# Patient Record
Sex: MALE | Race: OTHER | HISPANIC OR LATINO | URBAN - METROPOLITAN AREA
[De-identification: names, ages, dates, MRNs, and addresses within clinical notes are randomized per-mention and may not be internally consistent; named-entity substitution may affect disease eponyms.]

---

## 2020-07-06 ENCOUNTER — INPATIENT (INPATIENT)
Facility: HOSPITAL | Age: 25
LOS: 9 days | Discharge: HOME | End: 2020-07-16
Attending: PSYCHIATRY & NEUROLOGY | Admitting: PSYCHIATRY & NEUROLOGY
Payer: OTHER MISCELLANEOUS

## 2020-07-06 VITALS
TEMPERATURE: 98 F | DIASTOLIC BLOOD PRESSURE: 75 MMHG | SYSTOLIC BLOOD PRESSURE: 134 MMHG | HEART RATE: 77 BPM | RESPIRATION RATE: 18 BRPM | OXYGEN SATURATION: 99 %

## 2020-07-06 DIAGNOSIS — Z90.5 ACQUIRED ABSENCE OF KIDNEY: Chronic | ICD-10-CM

## 2020-07-06 DIAGNOSIS — F12.20 CANNABIS DEPENDENCE, UNCOMPLICATED: ICD-10-CM

## 2020-07-06 DIAGNOSIS — F19.94 OTHER PSYCHOACTIVE SUBSTANCE USE, UNSPECIFIED WITH PSYCHOACTIVE SUBSTANCE-INDUCED MOOD DISORDER: ICD-10-CM

## 2020-07-06 LAB
AMPHET UR-MCNC: NEGATIVE — SIGNIFICANT CHANGE UP
APPEARANCE UR: CLEAR — SIGNIFICANT CHANGE UP
BACTERIA # UR AUTO: NEGATIVE — SIGNIFICANT CHANGE UP
BARBITURATES UR SCN-MCNC: NEGATIVE — SIGNIFICANT CHANGE UP
BENZODIAZ UR-MCNC: NEGATIVE — SIGNIFICANT CHANGE UP
BILIRUB UR-MCNC: NEGATIVE — SIGNIFICANT CHANGE UP
BLOOD UR QL VISUAL: NEGATIVE — SIGNIFICANT CHANGE UP
CANNABINOIDS UR-MCNC: POSITIVE — SIGNIFICANT CHANGE UP
COCAINE METAB.OTHER UR-MCNC: NEGATIVE — SIGNIFICANT CHANGE UP
COLOR SPEC: YELLOW — SIGNIFICANT CHANGE UP
GLUCOSE UR-MCNC: NEGATIVE — SIGNIFICANT CHANGE UP
HYALINE CASTS # UR AUTO: NEGATIVE — SIGNIFICANT CHANGE UP
KETONES UR-MCNC: SIGNIFICANT CHANGE UP
LEUKOCYTE ESTERASE UR-ACNC: SIGNIFICANT CHANGE UP
METHADONE UR-MCNC: NEGATIVE — SIGNIFICANT CHANGE UP
NITRITE UR-MCNC: NEGATIVE — SIGNIFICANT CHANGE UP
OPIATES UR-MCNC: NEGATIVE — SIGNIFICANT CHANGE UP
OXYCODONE UR-MCNC: POSITIVE — HIGH
PCP UR-MCNC: NEGATIVE — SIGNIFICANT CHANGE UP
PH UR: 6 — SIGNIFICANT CHANGE UP (ref 5–8)
PROT UR-MCNC: 50 — SIGNIFICANT CHANGE UP
RBC CASTS # UR COMP ASSIST: SIGNIFICANT CHANGE UP (ref 0–?)
SP GR SPEC: 1.04 — SIGNIFICANT CHANGE UP (ref 1–1.04)
SQUAMOUS # UR AUTO: SIGNIFICANT CHANGE UP
UROBILINOGEN FLD QL: NORMAL — SIGNIFICANT CHANGE UP
WBC UR QL: HIGH (ref 0–?)

## 2020-07-06 RX ORDER — DIPHENHYDRAMINE HCL 50 MG
50 CAPSULE ORAL EVERY 6 HOURS
Refills: 0 | Status: DISCONTINUED | OUTPATIENT
Start: 2020-07-07 | End: 2020-07-16

## 2020-07-06 RX ORDER — HALOPERIDOL DECANOATE 100 MG/ML
5 INJECTION INTRAMUSCULAR EVERY 6 HOURS
Refills: 0 | Status: DISCONTINUED | OUTPATIENT
Start: 2020-07-07 | End: 2020-07-16

## 2020-07-06 NOTE — ED BEHAVIORAL HEALTH ASSESSMENT NOTE - NS ED [facility] MED ROS CONSTITUTIONAL SYMPTOMS
Virtual Regular Visit    Reason for visit is      Encounter provider Ronnie Welsh PA-C    Provider located at [address] [facility] [address] [facility]  [address] [facility] [address] [place] [address] [phone]      [facility] [address] )     [address] [place] [address] face-to-face evaluation by the physician  Eusebia Cruz understands and accepts these terms  No complaints

## 2020-07-06 NOTE — ED PROVIDER NOTE - CARE PLAN
Principal Discharge DX:	Cannabis use disorder, severe, dependence Principal Discharge DX:	Substance induced mood disorder

## 2020-07-06 NOTE — ED BEHAVIORAL HEALTH ASSESSMENT NOTE - OTHER PAST PSYCHIATRIC HISTORY (INCLUDE DETAILS REGARDING ONSET, COURSE OF ILLNESS, INPATIENT/OUTPATIENT TREATMENT)
Patient has 2 past psych admissions, once in 2015 s/p taking 12 tabs acid and becoming suicidal and jumping out 2nd floor window, once in March 2019 2/2 aggressive behavior. Prior dx include cannabis use d/o, unspecified psychosis, conduct disorder, opiod related disorder, unspecified anxiety disorder, unspecified depressive disorder. Pt also did outpt rehab x1 week in 2017. Last outpt psych care in 2015. Patient has 2 past psych admissions, once in 2015 s/p taking 12 tabs acid and becoming suicidal and jumping out 2nd floor window, once in March 2019 2/2 aggressive behavior. Prior dx include cannabis use d/o, unspecified psychosis, conduct disorder, opoid related disorder, unspecified anxiety disorder, unspecified depressive disorder. Pt also did outpt rehab x1 week in 2017. Last outpt psych care in 2015.

## 2020-07-06 NOTE — ED BEHAVIORAL HEALTH ASSESSMENT NOTE - SUICIDE RISK FACTORS
Alcohol/Substance abuse disorders/Conduct problems current/past/Cluster B Personality disorders or traits current/past/Impulsivity

## 2020-07-06 NOTE — ED ADULT TRIAGE NOTE - CHIEF COMPLAINT QUOTE
Patient brought to ER by EMS for psych evaluation from home. Pt has been noncompliant with his medications and is willing to speak with MD concerning his aggressiveness and the fact that he needs help. Pt is accompanied by his brother Shine: [phone].

## 2020-07-06 NOTE — ED PROVIDER NOTE - CLINICAL SUMMARY MEDICAL DECISION MAKING FREE TEXT BOX
This is  a24 yr M, [facility] psychosis with c/o acting out behaviour, agitation, delusional. Pt states he is here because he needs help. He states he can not handle his anger all the time, and he is ready for to get better. He endorses previous psychiatric hospitalization, but no follow ups. Labs, psych consult. This is  a24 yr M, pmh psychosis with c/o acting out behaviour, agitation, delusional. Pt states he is here because he needs help. He states he can not handle his anger all the time, and he is ready for to get better. He endorses previous psychiatric hospitalization, but no follow ups. Labs- results WNL , psych consult- out patient follow up. This is  a24 yr M, pmh psychosis with c/o acting out behaviour, agitation, delusional. Pt states he is here because he needs help. He states he can not handle his anger all the time, and he is ready for to get better. He endorses previous psychiatric hospitalization, but no follow ups. Labs- results WNL , psych consult- recommendation in patient follow up.

## 2020-07-06 NOTE — ED BEHAVIORAL HEALTH ASSESSMENT NOTE - SUMMARY
Patient is a 25 y/o male, domiciled w/ mother, employed in [employer], recently accepted to [place], w/ PMHx single kidney 2/2 resection s/p infection as an infant, w/ PPHx cannabis use d/o vs unspecified psychosis, w/ hx 2 past inpt psych admissions (most recently March 2019), w/ hx one SA via jumping out 2nd floor window in the context of LSD intoxication, w/ legal hx and hx violence towards , w/ hx of extensive cannabis use, remote hx of LSD use, BIBEMS after argument w/ mother. Per collateral, pt appears to have hx of behavioral problems and possible psychotic sx exclusively in the context of extensive cannabis use. Family reports pt has not endorsed SI/HI to them or been violent in the home. On evaluation, pt denies SI/HI; no psychotic sx elicited. Overall pt likely w/ strange behavior and possible psychotic sx in the context of heavy cannabis abuse and possible underlying cluster B personality disorder. There is high suspicion that sx are substance induced. At this time, pt is not an acute risk to himself or others and he cannot be involuntarily admitted to a psychiatric hospital. Plan to discharge w/ substance clinic info f/u. Patient is a 23 y/o male, domiciled w/ mother, employed in [employer], recently accepted to [facility], w/ PMHx single kidney 2/2 resection s/p infection as an infant, w/ PPHx cannabis use d/o vs unspecified psychosis, w/ hx 2 past inpt psych admissions (most recently March 2019), w/ hx one SA via jumping out 2nd floor window in the context of LSD intoxication, w/ legal hx and hx violence towards , w/ hx of extensive cannabis use, remote hx of LSD use, BIBEMS after argument w/ mother. Per collateral, pt appears to have hx of behavioral problems, irritability, and possible psychotic sx in the context of extensive cannabis use. Family reports pt has not endorsed SI/HI to them or been violent in the home. On evaluation, pt denies SI/HI; no psychotic sx elicited, though w/ some manic sx including racing thoughts and grandiosity. Overall pt likely w/ strange behavior in the context of heavy cannabis abuse and possible hypomanic episode. There is high suspicion that sx are substance induced. At this time, pt is not an acute risk to himself or others. He is refusing admission, and he cannot be involuntarily admitted to a psychiatric hospital. Plan to discharge w/ substance clinic info and Crisis Clinic f/u. Patient is a 25 y/o male, domiciled w/ mother, employed in [employer], recently accepted to [place], w/ PMHx single kidney 2/2 resection s/p infection as an infant, w/ PPHx cannabis use d/o vs unspecified psychosis, w/ hx 2 past inpt psych admissions (most recently March 2019), w/ hx one SA via jumping out 2nd floor window in the context of LSD intoxication, w/ legal hx and hx violence towards , w/ hx of extensive cannabis use, remote hx of LSD use, BIBEMS after argument w/ mother. Per collateral, pt appears to have hx of behavioral problems, irritability, and possible psychotic sx in the context of extensive cannabis use. Family reports pt has not endorsed SI/HI to them or been violent in the home.   On evaluation, pt denies SI/HI; no psychotic sx elicited, though w/ some manic sx including racing thoughts and grandiosity, insomnia , increase in spending money . Patient initially declines psychiatric admission , however pt's mother refused to take pt home and pt spoke to the mother who asked pt to get help. Pt si agreeing to voluntary admission for stabilization and to start treatment.

## 2020-07-06 NOTE — ED BEHAVIORAL HEALTH NOTE - BEHAVIORAL HEALTH NOTE
Writer contacted pt’s brother, Shine, at [phone] to continue conversation previously had with MD. Writer received VM with mailbox full.

## 2020-07-06 NOTE — ED BEHAVIORAL HEALTH ASSESSMENT NOTE - REFERRAL / APPOINTMENT DETAILS
Referred to substance clinic. Referred to substance clinic and given info for Behavioral Health Crisis Clinic.

## 2020-07-06 NOTE — ED PROVIDER NOTE - PATIENT PORTAL LINK FT
You can access the FollowMyHealth Patient Portal offered by [facility] by registering at the following website: [email or web address]. By joining [facility]’s FollowMyHealth portal, you will also be able to view your health information using other applications (apps) compatible with our system.

## 2020-07-06 NOTE — ED BEHAVIORAL HEALTH ASSESSMENT NOTE - DESCRIPTION
Pt calm and cooperative in ED.    Vital Signs Last 24 Hrs  T(C): 36.9 (06 Jul 2020 19:32), Max: 36.9 (06 Jul 2020 19:32)  T(F): 98.4 (06 Jul 2020 19:32), Max: 98.4 (06 Jul 2020 19:32)  HR: 77 (06 Jul 2020 19:32) (77 - 77)  BP: 134/75 (06 Jul 2020 19:32) (134/75 - 134/75)  BP(mean): --  RR: 18 (06 Jul 2020 19:32) (18 - 18)  SpO2: 99% (06 Jul 2020 19:32) (99% - 99%) renal agenesis s/p resection see hpi

## 2020-07-06 NOTE — ED BEHAVIORAL HEALTH ASSESSMENT NOTE - HPI (INCLUDE ILLNESS QUALITY, SEVERITY, DURATION, TIMING, CONTEXT, MODIFYING FACTORS, ASSOCIATED SIGNS AND SYMPTOMS)
Patient is a 23 y/o male, domiciled w/ mother, employed in [employer], recently accepted to [employer], w/ PMHx single kidney 2/2 resection s/p infection as an infant, w/ PPHx cannabis use d/o vs unspecified psychosis, w/ hx 2 past inpt psych admissions (most recently March 2019), w/ hx one SA via jumping out 2nd floor window in the context of LSD intoxication, w/ legal hx and hx violence towards , w/ hx of extensive cannabis use, remote hx of LSD use, BIBEMS after argument w/ mother.    Patient reports that he "snapped" and got in an argument w/ his mother today when they were arguing about money. He says they were yelling, but there were no physical blows. He says he took a knife and put it on his dresser to let his mother know not to call the , but he says he never intended to hurt her with it and let her take it away without a fight. Denies access to guns. He says he has been thinking a lot about his life and his success and is frustrated with how his mother treats him. Pt endorses extensive hx of substance use. Currently reports smoking cannabis 5-10x daily, most recently 2 days ago. Denies K2/spice use. Also drinks alcohol regularly, increasing amount of intake, drank 1 bottle of wine last night. Reports taking 1 Percocet today for back pain. Reports "smoking cocaine" in 2017. Reports taking 12 tabs of LSD in 2015 to celebrate a rugby team win, after which he became suicidal and jumped out a 2nd floor window, resulting in a psych admission. Denies additional SI ever. He says he is looking forward to starting school at [employer] to study computer engineering. Denies HI. Denies AVH, paranoia. He says his family says he sometimes "speaks in riddles," but he says he just does this because he is lying. Reports limited sleep (~4 hours a night), but says this is his baseline. Reports "calm" mood, denies depressive sx.     Collateral was obtained from pt's brother and mother (see contact info above). Brother reports that the pt has a hx of "episodes" where he is "not himself," sometimes talking to the wall. He says today he took his mother's debit card and was packing stuff to leave and when his mother asked what he was doing, he started yelling, saying he's in charge of the house. Mother denies any threats to physically harm her and denies any hx of violence in the home. Both mother and brother deny any recent suicidal or homicidal/violent ideations from the pt. Brother reports the pt sometimes speaks "in riddles" and is long winded. He says he mentioned possibly using K2 today and uses a lot of marijuana. They say he is "fixated on money," sometimes talking to himself for hours at night, sometimes accusing the mother of coming into his room when she hasn't. She says he changes his clothes a lot. Patient is a 25 y/o male, domiciled w/ mother, employed in [employer], recently accepted to [employer], w/ PMHx single kidney 2/2 resection s/p infection as an infant, w/ PPHx cannabis use d/o vs unspecified psychosis, w/ hx 2 past inpt psych admissions (most recently March 2019), w/ hx one SA via jumping out 2nd floor window in the context of LSD intoxication, w/ legal hx and hx violence towards , w/ hx of extensive cannabis use, remote hx of LSD use, BIBEMS after argument w/ mother.    Patient reports that he "snapped" and got in an argument w/ his mother today when they were arguing about money. He says they were yelling, but there were no physical blows. He says he took a knife and put it on his dresser to let his mother know not to call the , but he says he never intended to hurt her with it and let her take it away without a fight. Denies access to guns. He says he has been thinking a lot about his life and his success and is frustrated with how his mother treats him. Pt endorses extensive hx of substance use. Currently reports smoking cannabis 5-10x daily, most recently 2 days ago. Denies K2/spice use. Also drinks alcohol regularly, increasing amount of intake, drank 1 bottle of wine last night. Reports taking 1 Percocet today for back pain. Reports "smoking cocaine" in 2017. Reports taking 12 tabs of LSD in 2015 to celebrate a rugby team win, after which he became suicidal and jumped out a 2nd floor window, resulting in a psych admission. Denies additional SI ever. He says he is looking forward to starting school at [employer] to study computer engineering. Denies HI. Denies AVH, paranoia. He says his family says he sometimes "speaks in riddles," but he says he just does this because he is lying. Reports limited sleep (~4 hours a night), but says this is his baseline. Reports "calm" mood, denies depressive sx.     Collateral was obtained from pt's brother and mother (see contact info above). Brother reports that the pt has a hx of "episodes" where he is "not himself," sometimes talking to the wall. He says today he took his mother's debit card and was packing stuff to leave and when his mother asked what he was doing, he started yelling, saying he's in charge of the house. Mother denies any threats to physically harm her and denies any hx of violence in the home. Both mother and brother deny any recent suicidal or homicidal/violent ideations from the pt. Brother reports the pt sometimes speaks "in riddles" and is long winded. He says he mentioned possibly using K2 today and uses a lot of marijuana. They say he is "fixated on money," sometimes talking to himself for hours at night, sometimes accusing the mother of coming into his room when she hasn't. She says he changes his clothes a lot. They report he eats, sleeps, and takes care of himself well otherwise.

## 2020-07-06 NOTE — ED BEHAVIORAL HEALTH ASSESSMENT NOTE - RISK ASSESSMENT
Acute- substance abuse, mood lability, irritability, impulsivity  chronic- hx SA, hx substance abuse, hx hospitalizations, legal hx  At this time, pt is not an acutely elevated risk for suicide though he has chronic risk factors that cannot be mitigated by a psychiatric admission. Low Acute Suicide Risk

## 2020-07-06 NOTE — ED BEHAVIORAL HEALTH ASSESSMENT NOTE - DIFFERENTIAL
cluster B personality d/o vs substance-induced mood/psychotic d/o vs bipolar d/o vs unspecified psychosis vs prodromal psychosis substance-induced mood/psychotic d/o vs bipolar d/o vs unspecified psychosis vs prodromal psychosis vs cluster b personality d/o substance-induced mood/psychotic d/o vs bipolar d/o, current episode manic vs unspecified psychosis vs prodromal psychosis vs cluster b personality d/o

## 2020-07-06 NOTE — ED PROVIDER NOTE - PROGRESS NOTE DETAILS
Pt signed out pending results of labs and COVID all of which were unremarkable.  Cleared for admission to [facility]
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
## 2020-07-06 NOTE — ED BEHAVIORAL HEALTH ASSESSMENT NOTE - DESCRIPTION (FIRST USE, LAST USE, QUANTITY, FREQUENCY, DURATION)
drank 1 bottle of wine last night, 2 bottles over past week, drinking more recently daily use, 5-10x daily smoked cocaine in 2017? LSD, pt took 12 tabs at once in 2015 while celebrating a sports win, then became suicidal and jumped off 2nd floor balcony
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
## 2020-07-07 DIAGNOSIS — F19.94 OTHER PSYCHOACTIVE SUBSTANCE USE, UNSPECIFIED WITH PSYCHOACTIVE SUBSTANCE-INDUCED MOOD DISORDER: ICD-10-CM

## 2020-07-07 LAB
ALBUMIN SERPL ELPH-MCNC: 4.9 G/DL — SIGNIFICANT CHANGE UP (ref 3.3–5)
ALP SERPL-CCNC: 52 U/L — SIGNIFICANT CHANGE UP (ref 40–120)
ALT FLD-CCNC: 23 U/L — SIGNIFICANT CHANGE UP (ref 4–41)
ANION GAP SERPL CALC-SCNC: 20 MMO/L — HIGH (ref 7–14)
APAP SERPL-MCNC: < 15 UG/ML — LOW (ref 15–25)
AST SERPL-CCNC: 22 U/L — SIGNIFICANT CHANGE UP (ref 4–40)
BASOPHILS # BLD AUTO: 0.03 K/UL — SIGNIFICANT CHANGE UP (ref 0–0.2)
BASOPHILS NFR BLD AUTO: 0.3 % — SIGNIFICANT CHANGE UP (ref 0–2)
BILIRUB SERPL-MCNC: 0.9 MG/DL — SIGNIFICANT CHANGE UP (ref 0.2–1.2)
BUN SERPL-MCNC: 19 MG/DL — SIGNIFICANT CHANGE UP (ref 7–23)
CALCIUM SERPL-MCNC: 10.4 MG/DL — SIGNIFICANT CHANGE UP (ref 8.4–10.5)
CHLORIDE SERPL-SCNC: 103 MMOL/L — SIGNIFICANT CHANGE UP (ref 98–107)
CO2 SERPL-SCNC: 17 MMOL/L — LOW (ref 22–31)
CREAT SERPL-MCNC: 1.26 MG/DL — SIGNIFICANT CHANGE UP (ref 0.5–1.3)
EOSINOPHIL # BLD AUTO: 0.04 K/UL — SIGNIFICANT CHANGE UP (ref 0–0.5)
EOSINOPHIL NFR BLD AUTO: 0.3 % — SIGNIFICANT CHANGE UP (ref 0–6)
ETHANOL BLD-MCNC: < 10 MG/DL — SIGNIFICANT CHANGE UP
GLUCOSE SERPL-MCNC: 85 MG/DL — SIGNIFICANT CHANGE UP (ref 70–99)
HCT VFR BLD CALC: 42.6 % — SIGNIFICANT CHANGE UP (ref 39–50)
HGB BLD-MCNC: 14.7 G/DL — SIGNIFICANT CHANGE UP (ref 13–17)
IMM GRANULOCYTES NFR BLD AUTO: 0.3 % — SIGNIFICANT CHANGE UP (ref 0–1.5)
LYMPHOCYTES # BLD AUTO: 2.66 K/UL — SIGNIFICANT CHANGE UP (ref 1–3.3)
LYMPHOCYTES # BLD AUTO: 22.9 % — SIGNIFICANT CHANGE UP (ref 13–44)
MCHC RBC-ENTMCNC: 31.2 PG — SIGNIFICANT CHANGE UP (ref 27–34)
MCHC RBC-ENTMCNC: 34.5 % — SIGNIFICANT CHANGE UP (ref 32–36)
MCV RBC AUTO: 90.4 FL — SIGNIFICANT CHANGE UP (ref 80–100)
MONOCYTES # BLD AUTO: 0.71 K/UL — SIGNIFICANT CHANGE UP (ref 0–0.9)
MONOCYTES NFR BLD AUTO: 6.1 % — SIGNIFICANT CHANGE UP (ref 2–14)
NEUTROPHILS # BLD AUTO: 8.13 K/UL — HIGH (ref 1.8–7.4)
NEUTROPHILS NFR BLD AUTO: 70.1 % — SIGNIFICANT CHANGE UP (ref 43–77)
NRBC # FLD: 0 K/UL — SIGNIFICANT CHANGE UP (ref 0–0)
PLATELET # BLD AUTO: 261 K/UL — SIGNIFICANT CHANGE UP (ref 150–400)
PMV BLD: 10.6 FL — SIGNIFICANT CHANGE UP (ref 7–13)
POTASSIUM SERPL-MCNC: 4.5 MMOL/L — SIGNIFICANT CHANGE UP (ref 3.5–5.3)
POTASSIUM SERPL-SCNC: 4.5 MMOL/L — SIGNIFICANT CHANGE UP (ref 3.5–5.3)
PROT SERPL-MCNC: 7.9 G/DL — SIGNIFICANT CHANGE UP (ref 6–8.3)
RBC # BLD: 4.71 M/UL — SIGNIFICANT CHANGE UP (ref 4.2–5.8)
RBC # FLD: 13.1 % — SIGNIFICANT CHANGE UP (ref 10.3–14.5)
SALICYLATES SERPL-MCNC: < 5 MG/DL — LOW (ref 15–30)
SARS-COV-2 IGG SERPL QL IA: NEGATIVE — SIGNIFICANT CHANGE UP
SARS-COV-2 IGM SERPL IA-ACNC: <0.1 INDEX — SIGNIFICANT CHANGE UP
SARS-COV-2 RNA SPEC QL NAA+PROBE: SIGNIFICANT CHANGE UP
SODIUM SERPL-SCNC: 140 MMOL/L — SIGNIFICANT CHANGE UP (ref 135–145)
TSH SERPL-MCNC: 3.5 UIU/ML — SIGNIFICANT CHANGE UP (ref 0.27–4.2)
WBC # BLD: 11.6 K/UL — HIGH (ref 3.8–10.5)
WBC # FLD AUTO: 11.6 K/UL — HIGH (ref 3.8–10.5)

## 2020-07-07 PROCEDURE — 99222 1ST HOSP IP/OBS MODERATE 55: CPT | Mod: GC

## 2020-07-07 RX ORDER — LIDOCAINE 4 G/100G
1 CREAM TOPICAL ONCE
Refills: 0 | Status: COMPLETED | OUTPATIENT
Start: 2020-07-07 | End: 2020-07-07

## 2020-07-07 RX ORDER — LIDOCAINE 4 G/100G
1 CREAM TOPICAL EVERY 24 HOURS
Refills: 0 | Status: DISCONTINUED | OUTPATIENT
Start: 2020-07-07 | End: 2020-07-16

## 2020-07-07 RX ORDER — ARIPIPRAZOLE 15 MG/1
5 TABLET ORAL AT BEDTIME
Refills: 0 | Status: DISCONTINUED | OUTPATIENT
Start: 2020-07-07 | End: 2020-07-07

## 2020-07-07 RX ORDER — DIPHENHYDRAMINE HCL 50 MG
50 CAPSULE ORAL EVERY 6 HOURS
Refills: 0 | Status: DISCONTINUED | OUTPATIENT
Start: 2020-07-07 | End: 2020-07-16

## 2020-07-07 RX ORDER — HALOPERIDOL DECANOATE 100 MG/ML
5 INJECTION INTRAMUSCULAR EVERY 6 HOURS
Refills: 0 | Status: DISCONTINUED | OUTPATIENT
Start: 2020-07-07 | End: 2020-07-16

## 2020-07-07 RX ADMIN — HALOPERIDOL DECANOATE 5 MILLIGRAM(S): 100 INJECTION INTRAMUSCULAR at 12:21

## 2020-07-07 RX ADMIN — Medication 2 MILLIGRAM(S): at 12:22

## 2020-07-07 RX ADMIN — LIDOCAINE 1 PATCH: 4 CREAM TOPICAL at 12:22

## 2020-07-07 NOTE — ED ADULT NURSE REASSESSMENT NOTE - NS ED NURSE REASSESS COMMENT FT1
EKG completed, covid result pending for ANNABELLE and ANDRZEJ and. Pt currently laying in bed, NAD. Will continue to monitor for safety.
Psych eval completed, tp signed voluntary admission. Pt cooperative with Covid and lab specimen collected, results pending. EKG ordered. Pending [facility] adm once medically cleared. Currently, pt remains awake, talking to self in room, pacing hw without agitation. Will continue to monitor for safety.
MC by Dillan GANT for [facility] adm. Report given to Boyd PINO. Pt transported at this time with security and PCA for voluntary adm.

## 2020-07-08 LAB
CHOLEST SERPL-MCNC: 183 MG/DL — SIGNIFICANT CHANGE UP (ref 120–199)
HBA1C BLD-MCNC: 5.1 % — SIGNIFICANT CHANGE UP (ref 4–5.6)
HDLC SERPL-MCNC: 40 MG/DL — SIGNIFICANT CHANGE UP (ref 35–55)
LIPID PNL WITH DIRECT LDL SERPL: 132 MG/DL — SIGNIFICANT CHANGE UP
TRIGL SERPL-MCNC: 82 MG/DL — SIGNIFICANT CHANGE UP (ref 10–149)

## 2020-07-08 PROCEDURE — 99232 SBSQ HOSP IP/OBS MODERATE 35: CPT | Mod: GC

## 2020-07-08 RX ORDER — OLANZAPINE 15 MG/1
5 TABLET, FILM COATED ORAL AT BEDTIME
Refills: 0 | Status: DISCONTINUED | OUTPATIENT
Start: 2020-07-08 | End: 2020-07-10

## 2020-07-08 RX ADMIN — OLANZAPINE 5 MILLIGRAM(S): 15 TABLET, FILM COATED ORAL at 19:03

## 2020-07-09 LAB
APPEARANCE UR: CLEAR — SIGNIFICANT CHANGE UP
BILIRUB UR-MCNC: NEGATIVE — SIGNIFICANT CHANGE UP
BLOOD UR QL VISUAL: SIGNIFICANT CHANGE UP
COLOR SPEC: YELLOW — SIGNIFICANT CHANGE UP
GLUCOSE UR-MCNC: NEGATIVE — SIGNIFICANT CHANGE UP
KETONES UR-MCNC: SIGNIFICANT CHANGE UP
LEUKOCYTE ESTERASE UR-ACNC: NEGATIVE — SIGNIFICANT CHANGE UP
NITRITE UR-MCNC: NEGATIVE — SIGNIFICANT CHANGE UP
PH UR: 6 — SIGNIFICANT CHANGE UP (ref 5–8)
PROT UR-MCNC: 10 — SIGNIFICANT CHANGE UP
SP GR SPEC: 1.03 — SIGNIFICANT CHANGE UP (ref 1–1.04)
UROBILINOGEN FLD QL: SIGNIFICANT CHANGE UP

## 2020-07-09 PROCEDURE — 99232 SBSQ HOSP IP/OBS MODERATE 35: CPT

## 2020-07-09 PROCEDURE — 73120 X-RAY EXAM OF HAND: CPT | Mod: 26,LT

## 2020-07-09 RX ORDER — IBUPROFEN 200 MG
400 TABLET ORAL EVERY 8 HOURS
Refills: 0 | Status: DISCONTINUED | OUTPATIENT
Start: 2020-07-09 | End: 2020-07-09

## 2020-07-09 RX ORDER — NICOTINE POLACRILEX 2 MG
2 GUM BUCCAL
Refills: 0 | Status: DISCONTINUED | OUTPATIENT
Start: 2020-07-09 | End: 2020-07-16

## 2020-07-09 RX ORDER — DIPHENHYDRAMINE HCL 50 MG
50 CAPSULE ORAL ONCE
Refills: 0 | Status: COMPLETED | OUTPATIENT
Start: 2020-07-09 | End: 2020-07-09

## 2020-07-09 RX ORDER — ACETAMINOPHEN 500 MG
325 TABLET ORAL EVERY 4 HOURS
Refills: 0 | Status: DISCONTINUED | OUTPATIENT
Start: 2020-07-09 | End: 2020-07-16

## 2020-07-09 RX ADMIN — Medication 2 MILLIGRAM(S): at 22:00

## 2020-07-09 RX ADMIN — Medication 50 MILLIGRAM(S): at 22:00

## 2020-07-09 RX ADMIN — OLANZAPINE 5 MILLIGRAM(S): 15 TABLET, FILM COATED ORAL at 21:11

## 2020-07-10 PROCEDURE — 99231 SBSQ HOSP IP/OBS SF/LOW 25: CPT | Mod: GC

## 2020-07-10 RX ORDER — LANOLIN ALCOHOL/MO/W.PET/CERES
5 CREAM (GRAM) TOPICAL AT BEDTIME
Refills: 0 | Status: DISCONTINUED | OUTPATIENT
Start: 2020-07-10 | End: 2020-07-16

## 2020-07-10 RX ORDER — OLANZAPINE 15 MG/1
7.5 TABLET, FILM COATED ORAL AT BEDTIME
Refills: 0 | Status: DISCONTINUED | OUTPATIENT
Start: 2020-07-10 | End: 2020-07-13

## 2020-07-10 RX ADMIN — Medication 2 MILLIGRAM(S): at 15:53

## 2020-07-10 RX ADMIN — Medication 2 MILLIGRAM(S): at 20:45

## 2020-07-10 RX ADMIN — Medication 5 MILLIGRAM(S): at 23:04

## 2020-07-10 RX ADMIN — Medication 325 MILLIGRAM(S): at 22:00

## 2020-07-10 RX ADMIN — OLANZAPINE 7.5 MILLIGRAM(S): 15 TABLET, FILM COATED ORAL at 20:56

## 2020-07-11 PROCEDURE — 99232 SBSQ HOSP IP/OBS MODERATE 35: CPT

## 2020-07-11 RX ADMIN — OLANZAPINE 7.5 MILLIGRAM(S): 15 TABLET, FILM COATED ORAL at 20:58

## 2020-07-11 RX ADMIN — Medication 2 MILLIGRAM(S): at 11:46

## 2020-07-11 RX ADMIN — Medication 5 MILLIGRAM(S): at 21:53

## 2020-07-11 RX ADMIN — Medication 2 MILLIGRAM(S): at 08:55

## 2020-07-11 RX ADMIN — Medication 5 MILLIGRAM(S): at 11:47

## 2020-07-13 ENCOUNTER — OUTPATIENT (OUTPATIENT)
Dept: OUTPATIENT SERVICES | Facility: HOSPITAL | Age: 25
LOS: 1 days | Discharge: HOME | End: 2020-07-13

## 2020-07-13 DIAGNOSIS — Z90.5 ACQUIRED ABSENCE OF KIDNEY: Chronic | ICD-10-CM

## 2020-07-13 PROCEDURE — 99232 SBSQ HOSP IP/OBS MODERATE 35: CPT | Mod: GC

## 2020-07-13 RX ORDER — OLANZAPINE 15 MG/1
10 TABLET, FILM COATED ORAL AT BEDTIME
Refills: 0 | Status: DISCONTINUED | OUTPATIENT
Start: 2020-07-13 | End: 2020-07-16

## 2020-07-13 RX ADMIN — OLANZAPINE 10 MILLIGRAM(S): 15 TABLET, FILM COATED ORAL at 20:46

## 2020-07-14 PROCEDURE — 99232 SBSQ HOSP IP/OBS MODERATE 35: CPT | Mod: GC

## 2020-07-14 RX ADMIN — OLANZAPINE 10 MILLIGRAM(S): 15 TABLET, FILM COATED ORAL at 21:26

## 2020-07-15 PROCEDURE — 99232 SBSQ HOSP IP/OBS MODERATE 35: CPT | Mod: GC

## 2020-07-15 RX ORDER — OLANZAPINE 15 MG/1
1 TABLET, FILM COATED ORAL
Qty: 30 | Refills: 0
Start: 2020-07-15 | End: 2020-08-13

## 2020-07-15 RX ADMIN — OLANZAPINE 10 MILLIGRAM(S): 15 TABLET, FILM COATED ORAL at 21:41

## 2020-07-16 VITALS — DIASTOLIC BLOOD PRESSURE: 66 MMHG | TEMPERATURE: 98 F | HEART RATE: 68 BPM | SYSTOLIC BLOOD PRESSURE: 128 MMHG

## 2021-03-10 NOTE — ED BEHAVIORAL HEALTH ASSESSMENT NOTE - ABORTED (SELF-INTERRUPTED) ATTEMPT:
ENDOCRINE CONSULTATION    Chief Complaint   Patient presents with   â¢ Diabetes Mellitus     New Consult- Referred by Ruy Carlson MD     HISTORY OF PRESENT ILLNESS:  Evaristo Santiago is here for initial endocrinology consultation for uncontrolled type 2 diabetes mellitus with hyperglycemia. Fransisca Hicks is accompanied by her  today. Her diabetes is complicated by metastatic breast cancer with history of stroke about years ago she uses a cane for support. She has brought her OneTouch Verio glucometer with her however her test strips bottle shows an expiration date of 2016. She is testing glucose daily and high glucose readings are noted. She is currently on glimepiride 2 mg daily before breakfast and dinner metformin extended release was reduced from 1000 mg twice a day to 500 mg twice a day. She says that she is eating at regular intervals and her appetite is good. Diabetes review type 2 diabetes uncontrolled because of chemotherapy recently. Glucose testing usually once a day brought in her glucometer although test strips appear  average glucose of 250 mg/dL noted glucose readings are above 200 all the time. REVIEW OF SYSTEMS:  Constitutional Review weight loss of 2 kg noted on chart review since her appointment in 2020 with her primary physician Dr. Yoly Mcgovern. No complains of severe fatigue. Eyes no acute vision complaints. Last Diabetic Eye Exam:  not recently. Cardiovascular/Respiratory no acute breathing problems. Urinary she has a history of urinary frequency no acute complaints. GI she is on Linzess. No acute complaints today. Skin no present complaints. Musculoskeletal uses cane for support since her stroke complaints. Neurological history of migraine headaches history of stroke with right hemiparesis. No acute complaints today. Psychological no acute complaints today. Gyn  4 para 4.   Hematology-Oncology history of breast cancer with metastases. Endocrine hypothyroidism on stable dose of levothyroxine 88 mcg daily. Outpatient Medications Marked as Taking for the 3/10/21 encounter (Office Visit) with Sri Li MD   Medication Sig Dispense Refill   â¢ Alpelisib, 300 MG Daily Dose, 2 x 150 MG Tablet Therapy Pack Take 1 tablet by mouth daily (with breakfast). BIN#: [id number] PCN#:[id number] [facility]#: [id number] #:[id number] 28 each 0   â¢ docusate sodium-sennosides (GoodSense Stimulant Laxative) 50-8.6 MG per tablet Take 2 tablets by mouth daily. â¢ glimepiride (Amaryl) 2 MG tablet Take 1 tablet by mouth 2 times daily (before meals). 180 tablet 1   â¢ busPIRone (BUSPAR) 15 MG tablet Take 1 tablet by mouth 2 times daily. 60 tablet 0   â¢ divalproex (DEPAKOTE) 500 MG delayed release EC tablet Take 1 tablet by mouth 2 times daily. Take with 250mg tablet to equal 750mg (Patient taking differently: Take 500 mg by mouth nightly. Take with 250mg tablet to equal 750mg) 60 tablet 1   â¢ divalproex (DEPAKOTE) 250 MG delayed release EC tablet Take 1 tablet by mouth 2 times daily. Take with 500mg tablet to equal 750mg . (Patient taking differently: Take 250 mg by mouth nightly. Take with 500mg tablet to equal 750mg .) 60 tablet 1   â¢ HYDROmorphone (Dilaudid) 2 MG tablet 1-2 tabs by mouth q4-6h PRN pain. (Patient taking differently: Take 2-4 mg by mouth every 4 to 6 hours as needed for Pain. ) 60 tablet 0   â¢ fentaNYL (DURAGESIC) 25 MCG/HR patch Place 1 patch onto the skin every 3 days. 10 patch 0   â¢ prochlorperazine (COMPAZINE) 10 MG tablet Take 1 tablet by mouth every 6 hours as needed for Nausea or Vomiting. 30 tablet 5   â¢ loperamide (IMODIUM) 2 MG capsule Take 1 capsule by mouth 4 times daily as needed for Diarrhea. Take 4 mg by mouth at first loose stool, then 2 mg every 2 hours until diarrhea-free for 12 hours. 30 capsule 6   â¢ gabapentin (NEURONTIN) 300 MG capsule Take 1 capsule by mouth 2 times daily.  180 capsule 0   â¢ rosuvastatin (CRESTOR) 10 MG tablet Take 1 tablet by mouth daily. 90 tablet 1   â¢ venlafaxine XR (EFFEXOR XR) 150 MG 24 hr capsule Take 1 capsule by mouth daily. 30 capsule 3   â¢ venlafaxine XR (EFFEXOR XR) 75 MG 24 hr capsule Take 1 capsule by mouth daily. 30 capsule 3   â¢ QUEtiapine (SEROquel) 100 MG tablet Take 1 tablet by mouth nightly. 30 tablet 3   â¢ benztropine (COGENTIN) 0.5 MG tablet Take 1 tablet by mouth nightly. For tremors 30 tablet 3   â¢ SUMAtriptan (IMITREX STATDOSE) 6 MG/0.5ML auto-injector Inject contents of one syringe (6mg) into the skin 2x daily. At least 1 hour between doses as needed 6 mL 11   â¢ linaclotide (LINZESS) 290 MCG Take 1 capsule by mouth daily (before breakfast). 30 capsule 3   â¢ hydrOXYzine (ATARAX) 25 MG tablet TAKE 1 TABLET BY MOUTH EVERY 6 HOURS AS NEEDED FOR ANXIETY 90 tablet 3   â¢ amitriptyline (ELAVIL) 10 MG tablet Take 2 tablets by mouth nightly. 180 tablet 1   â¢ montelukast (SINGULAIR) 10 MG tablet Take 1 tablet by mouth nightly. Please make follow up appointment. Thank you. 90 tablet 0   â¢ apixaBAN (Eliquis) 5 MG Tab Take 1 tablet by mouth every 12 hours. 60 tablet 3   â¢ metFORMIN (GLUCOPHAGE-XR) 500 MG 24 hr tablet Take 4 tablets by mouth daily (with breakfast). 360 tablet 1   â¢ oxybutynin (DITROPAN-XL) 10 MG 24 hr tablet Take 1 tablet by mouth daily. For urinary urgency 90 tablet 1   â¢ blood glucose test strip Test blood sugar 1 times daily as directed. Diagnosis: E11.9. Meter: 100 each 3   â¢ blood glucose meter Test blood sugar 1 times daily as directed. Diagnosis: E11.9. Meter: 1 kit 0   â¢ blood glucose lancets Test blood sugar 1 times daily as directed. Diagnosis: ICD-10 E11.9. Meter: 200 each 3   â¢ clonazePAM (KLONOPIN) 0.5 MG tablet Take 1 tablet by mouth 2 times daily as needed for Anxiety. 15 tablet 0   â¢ Cholecalciferol (Vitamin D-3) 25 mcg (1,000 units) capsule Take 1 capsule by mouth daily. 90 capsule 3   â¢ levothyroxine 88 MCG tablet Take 1 tablet by mouth daily.  90 tablet 3   â¢ cimetidine (TAGAMET) 400 MG tablet Take 1 tablet by mouth nightly. 90 tablet 3   â¢ pantoprazole (PROTONIX) 40 MG tablet Take 1 tablet by mouth 2 times daily (before meals). 60 tablet 11   â¢ Lidocaine HCl (60 ML EACH OF LIDOCAINE,DIPHENHYDRAMINE, AND MAALOX) Take 5 mLs by mouth every 4 hours as needed (mouth pain). Swish and spit. 180 mL 0   â¢ fluticasone (FLONASE) 50 MCG/ACT nasal spray Spray 2 sprays in each nostril daily. (Patient taking differently: Spray 2 sprays in each nostril daily as needed (allergy season. ). ) 1 Bottle 4   â¢ naLOXone (NARCAN) 4 MG/0.1ML nasal spray Call 911. [place] the content of 1 device into 1 nostril. May repeat with 2nd device in alternate nostril if no response in 2-3 minutes. 2 each 0   â¢ albuterol (VENTOLIN HFA) 108 (90 Base) MCG/ACT inhaler Inhale 1 puff into the lungs every 4 hours as needed for Shortness of Breath or Wheezing. 3 Inhaler 0   â¢ olopatadine (PATADAY) 0.2 % ophthalmic solution Place 1 drop into both eyes daily. 2.5 mL 4   â¢ EPINEPHrine (EPIPEN 2-BRYANNA) 0.3 MG/0.3ML auto-injector Inject 0.3 mLs into the muscle as needed for Anaphylaxis. 1 each 1      ALLERGIES:   Allergen Reactions   â¢ Contrast Media SEIZURES and Nausea & Vomiting   â¢ Benadryl [Altaryl]      Swelling of eyes   â¢ Latex      suspect latex allergy: rash with elastic knee brace   â¢ Percocet [Oxycodone-Acetaminophen] RASH     Drug rash. â¢ Shrimp   (Food)    â¢ Cefdinir GI UPSET     Heartburn  Tolerated cefepime 6/2018, tolerated Cefpodoxime 11/18   â¢ Cephalexin GI UPSET     Heartburn  Tolerated cefepime 6/2018, tolerated Cefpodoxime 11/18   â¢ Penicillins RASH     Mother informed the patient that she was allergic to it from childhood. Patient does not recall reaction. Tolerated cefepime 6/2018   â¢ Meloxicam RASH   â¢ Sulfate RASH     Drug rash, NOS. â¢ Zithromax [Azithromycin Dihydrate] RASH     Drug rash, NOS.        Past Medical History:   Diagnosis Date   â¢ Abnormal gait 12/28/2017   â¢ Anemia    â¢ Anxiety    â¢ Asthma Followed by Dr. Malaika Beyer. Triggers include dusts, pollen. â¢ Breast CA (CMS/HCC) 8/2016    Right   â¢ Breast cancer (CMS/HCC) 06/2018    metestatic breast cancer to bone and lung   â¢ Cerebral infarction (CMS/HCC) 2014    tia   â¢ Cervical spondylosis 11/14/2017   â¢ Chronic constipation    â¢ Chronic kidney disease (CKD), stage III (moderate) (CMS/[facility])    â¢ Clinical depression 4/29/2014   â¢ Closed head injury 01/07/2019    fall at home- lost balance and fell to ground - no loss of consciousness  -left shoulder , right hip and bilateral knee pain along w/ head pain    â¢ Depression    â¢ Diabetes mellitus, type 2 (CMS/HCC) 11/18/2013 01/02/2020 A1c 5.4   â¢ Disorder of bone and cartilage, unspecified 11/23/2011   â¢ Elevated liver enzymes 1/23/2017   â¢ Esophageal reflux    â¢ Fibromyalgia    â¢ Gastroesophageal reflux disease 5/19/2016   â¢ GERD (gastroesophageal reflux disease) 6/28/2013   â¢ H. pylori infection 7/28/2016   â¢ Hyperlipidemia    â¢ Hypothyroid    â¢ Hypothyroidism 3/4/2016   â¢ IBS (irritable bowel syndrome)    â¢ Inflammatory arthritis    â¢ Malignant neoplasm of right breast in female, estrogen receptor positive (CMS/HCC) 2/14/2017     T2 N1 stage II B breast CA 8/30/16 S/P R. Mastectomy S/P Chemo - Adriamycin ,Cytoxan and  Taxol 3/2017, SWOG 1207 trial.Radiotherapy 4/17-5/17. Anastrazole  6/17. 3/18 Recurrent, metastatic dis to the bone and lung S/P15 radiotherapy  5/1/2018. Currently On chemotherapy- XGeva ,Faslodex ,Verzenio ,Anastrozole  Continued last seen by Oncology on 2/20/20 Dr. Tristan Becerra. â¢ Metastatic breast cancer to bone (CMS/HCC) 4/3/2018     T2 N1 stage II B breast CA 8/30/16 S/P R. Mastectomy S/P Chemo - Adriamycin ,Cytoxan and  Taxol 3/2017, SWOG 1207 trial.Radiotherapy 4/17-5/17. Anastrazole  6/17. 3/18 Recurrent, metastatic dis to the bone and lung S/P15 radiotherapy  5/1/2018.  Currently On chemotherapy- XGeva ,Faslodex ,Verzenio ,Anastrozole  Continued last seen by Oncology on 2/20/20  Amari Loaiza. â¢ Migraine without aura, without mention of intractable migraine without mention of status migrainosus 4/20/2014   â¢ Multinodular goiter 2013   â¢ Onycholysis 1/16/2017   â¢ Osteoporosis    â¢ Pulmonary embolism (CMS/[facility]) 06/2020   â¢ Recurrent UTI 03/12/2019    lrecurrent urinary tract infections with gross hematuria-Dr Rani Bledsoe    â¢ Rhinitis    â¢ Right foot drop 8/24/2017   â¢ Seizure disorder (CMS/[facility]) 10/11/2014    1/21/19 multiple generalized convulsive episodes with postictal lethargy and confusion. Status epilepticus vs Â question of pseudoseizures-> [facility] for continuous VEEG - convulsive episodes but no no epileptic discharges on VEEG. Discharge on Depakote and Keppra.    â¢ Sleep apnea     cpap   â¢ Unspecified sinusitis (chronic)    â¢ Urticaria 8/28/2014   â¢ Vitamin D insufficiency    â¢ Weakness generalized      Past Surgical History:   Procedure Laterality Date   â¢ Axillary surgery Right     Cyst removal   â¢ Bartholin gland cyst excision  05/02/2011   â¢ Breast surgery Right 8/30/2016    Right mastectomy with sentinel lymph node biopsy   â¢ Bunionectomy Left 12/11/2014    Dr Bassam Sanders -with osteotomy    â¢ Dexa bone density axial skeleton  11/23/2011   â¢ Echo heart resting w doppler & color flow  04/07/2017    Post-chemo   â¢ Echo heart resting w doppler & color flow  03/27/2018   â¢ Esophagogastroduodenoscopy transoral flex w/bx single or mult  7/28/2016   â¢ Hand tendon surgery  11/17/2011    excision lesion on tendon sheath of hand   â¢ Hand tendon surgery  12/30/2010    arthroplasty carpal joint with implant   â¢ Hand tendon surgery  09/04/2008    tendon sheath incision/trigger finger   â¢ Hysterectomy  10/06/2011   â¢ Ir epidural injection Right 10/11/2018    Dr Jacobs Blocker - EPIDURAL STEROID TRANSFORAMINAL L/S 1ST LEVEL-UNILATERALRight   â¢ Portacath placement  10/15/2016   â¢ Removal of access port  06/06/2017    Dr Mcgee Comes    â¢ Service to gastroenterology  12/21/2011    colonoscopy with polypectomy    â¢ "Service to gastroenterology  7/12/13    EGD   â¢ Service to gastroenterology  2/23/2009    FLEX SIGMOID    â¢ Shoulder arthroscopy  11/19/2013    right shoulder- rotator cuff repair, biceps tendonesis    â¢ Subcutaneous port insertion  07/03/2018   â¢ Thyroid lobectomy,unilat Left 1/9/2014    left thyroid lobectomy -Dr Theresa Bose    â¢ Vaginal delivery      x4      Family History   Problem Relation Age of Onset   â¢ Diabetes Mother    â¢ Arthritis Mother    â¢ Heart disease Mother    â¢ Stroke Father         x4    â¢ Arthritis Brother    â¢ High cholesterol Daughter    â¢ Liver Disease Sister    â¢ Kidney disease Sister    â¢ Arthritis Sister    â¢ Arthritis Sister    â¢ Diabetes Sister    â¢ Arthritis Sister    â¢ High cholesterol Brother    â¢ Arthritis Brother    â¢ Arthritis Daughter    â¢ Stroke/TIA Daughter    â¢ Gastrointestinal Daughter         acid reflux    â¢ Stroke/TIA Daughter         related to medications    â¢ Cancer Maternal Aunt         breast   â¢ Cancer Maternal Aunt      Social History     Tobacco Use   â¢ Smoking status: Never Smoker   â¢ Smokeless tobacco: Never Used   Substance Use Topics   â¢ Alcohol use: No     Alcohol/week: 0.0 standard drinks     Frequency: Never     Drinks per session: 1 or 2     Binge frequency: Never   â¢ Drug use: No       PHYSICAL EXAMINATION:  Visit Vitals  /66 (BP Location: LUE - Left upper extremity, Patient Position: Sitting, Cuff Size: Regular)   Pulse 96   Ht 5' 4"" (1.626 m)   Wt 71.4 kg   LMP  (LMP Unknown)   BMI 27.02 kg/mÂ²     Body mass index is 27.02 kg/mÂ². Constitutional:  Well developed, well nourished, no acute distress, non-toxic appearance. Eyes:  Pupils equal, eye movements, sclera, conjunctivae normal.  HENT:  Atraumatic, external ears normal, external nose and mouth covered by facemask. Neck- Normal range of motion, no tenderness, supple. Thyroid gland is not enlarged, no distinct nodules are palpable.   Respiratory:  No respiratory distress, normal breath sounds, no " rales, no wheezing. Cardiovascular:  Normal rate, normal rhythm, no murmurs, no gallops, no rubs. Abdomen:  Soft, nondistended, no mass, no abnormal striae. Musculoskeletal:  No edema, no tenderness, no deformities. Back- No tenderness. Uses cane for support gait abnormality. Integument:  Well hydrated, no rash. Lymphatic:  No cervical lymphadenopathy noted. Neurologic:  Alert and oriented x3, CN (cranial nerves) 2-12 normal, abnormal motor function, right hemiparesis. Unintentional hand tremor noted more prominent on the right as compared to the left hand. Psychiatric:  Speech and behavior appropriate. LABS  Hemoglobin A1C (%)   Date Value   03/02/2021 8.5 (H)   12/29/2020 6.6 (H)   09/01/2020 8.5 (H)   08/31/2020 8.5 (H)   01/02/2020 5.4     TSH (mcUnits/mL)   Date Value   01/22/2021 2.557     Lab Services on 03/09/2021   Component Date Value Ref Range Status   â¢ Magnesium 03/09/2021 1.5* 1.7 - 2.4 mg/dL Final   â¢ Phosphorus 03/09/2021 3.9  2.4 - 4.7 mg/dL Final   â¢ Sodium 03/09/2021 135  135 - 145 mmol/L Final   â¢ Potassium 03/09/2021 4.5  3.4 - 5.1 mmol/L Final   â¢ Chloride 03/09/2021 97* 98 - 107 mmol/L Final   â¢ Carbon Dioxide 03/09/2021 29  21 - 32 mmol/L Final   â¢ Anion Gap 03/09/2021 14  10 - 20 mmol/L Final   â¢ Glucose 03/09/2021 299* 65 - 99 mg/dL Final   â¢ BUN 03/09/2021 20  6 - 20 mg/dL Final   â¢ Creatinine 03/09/2021 1.22* 0.51 - 0.95 mg/dL Final   â¢ Glomerular Filtration Rate 03/09/2021 48* >90 mL/min/1.73m2 Final    eGFR 30-59 mL/min/1.73m2 = Moderate decrease in kidney function. Stage 3 CKD (chronic kidney disease) or moderate kidney disease.    â¢ BUN/ Creatinine Ratio 03/09/2021 16  7 - 25 Final   â¢ Calcium 03/09/2021 8.2* 8.4 - 10.2 mg/dL Final   â¢ Bilirubin, Total 03/09/2021 0.3  0.2 - 1.0 mg/dL Final   â¢ GOT/AST 03/09/2021 14  <=37 Units/L Final   â¢ GPT/ALT 03/09/2021 20  <64 Units/L Final   â¢ Alkaline Phosphatase 03/09/2021 97  45 - 117 Units/L Final   â¢ Albumin 03/09/2021 3.3* 3.6 - 5.1 g/dL Final   â¢ Protein, Total 03/09/2021 6.9  6.4 - 8.2 g/dL Final   â¢ Globulin 03/09/2021 3.6  2.0 - 4.0 g/dL Final   â¢ A/G Ratio 03/09/2021 0.9* 1.0 - 2.4 Final   â¢ Extra Tube 03/09/2021 Hold for Add Ons   Final   â¢ WBC 03/09/2021 7.1  4.2 - 11.0 K/mcL Final   â¢ RBC 03/09/2021 3.48* 4.00 - 5.20 mil/mcL Final   â¢ HGB 03/09/2021 10.6* 12.0 - 15.5 g/dL Final   â¢ HCT 03/09/2021 30.9* 36.0 - 46.5 % Final   â¢ MCV 03/09/2021 88.8  78.0 - 100.0 fl Final   â¢ MCH 03/09/2021 30.5  26.0 - 34.0 pg Final   â¢ MCHC 03/09/2021 34.3  32.0 - 36.5 g/dL Final   â¢ RDW-CV 03/09/2021 14.9  11.0 - 15.0 % Final   â¢ RDW-SD 03/09/2021 47.6  39.0 - 50.0 fL Final   â¢ PLT 03/09/2021 226  140 - 450 K/mcL Final   â¢ Neutrophil, Percent 03/09/2021 42  % Final   â¢ Lymphocytes, Percent 03/09/2021 41  % Final   â¢ Mono, Percent 03/09/2021 12  % Final   â¢ Eosinophils, Percent 03/09/2021 2  % Final   â¢ Basophils, Percent 03/09/2021 1  % Final   â¢ Metamyelocytes, Percent  03/09/2021 1  0 - 2 % Final   â¢ Myelocytes, Percent 03/09/2021 1* <=0 % Final   â¢ Absolute Neutrophil 03/09/2021 3.0  1.8 - 7.7 K/mcL Final   â¢ Absolute Lymphocytes 03/09/2021 2.9  1.0 - 4.0 K/mcL Final   â¢ Absolute Monocytes 03/09/2021 0.9  0.3 - 0.9 K/mcL Final   â¢ Absolute Eosinophils 03/09/2021 0.1  0.0 - 0.5 K/mcL Final   â¢ Absolute Basophils 03/09/2021 0.1  0.0 - 0.3 K/mcL Final   â¢ RBC Morphology 03/09/2021 Normal  Normal Final   â¢ WBC Morphology 03/09/2021 Abnormal* Normal Final   â¢ Platelet Morphology 70/47/3548 Normal  Normal Final       ASSESSMENT   1. Uncontrolled type 2 diabetes mellitus with hyperglycemia, without long-term current use of insulin (CMS/[facility])    2. Hypothyroidism on levothyroxine 88 mcg daily. DISCUSSION:  First insulin injection was injected in the left lateral abdomen Lantus insulin pen samples with 5 pens and 50 pen needles were dispensed, use of insulin pen was demonstrated.   She is also advised to get her new test strips for her glucometer. She was explained that she should be checking glucose daily before breakfast and dinner and call in 2 weeks with glucose now. Metformin had been reduced because of her GI distress present does of 500 mg twice a day is being continued continues of glimepiride 2 mg before breakfast and dinner is also recommended. Based upon glucose readings in 2 weeks insulin doses will be adjusted. I thank Dr. Ralph ricardo for participation with Lashonda's care. PLAN:  Lantus insulin via pen 20 units was injected in the left lower abdomen. You will inject 20 units every morning. Continue with glimepiride 2 mg daily in the morning and metformin 500 mg twice a day. Continue to check glucose daily either before breakfast or before dinner. Our goal for glucose readings are to be less than 180 mg/dL ideally averaging at 150 mg/dL. Please call in 2 weeks with glucose numbers as insulin doses may have to be adjusted. The unused Lantus pens should be kept in the refrigerator the pen that your using at your present dose of 20 units once a day should last you for 15 days. You do not have to put the used pen in the refrigerator. Inject insulin in the abdomen  left or right side around the do not inject in the belly button area. Follow-up in 3 months is advised earlier follow-up if any problems or concerns. None known

## 2021-07-22 ENCOUNTER — TRANSCRIPTION ENCOUNTER (OUTPATIENT)
Age: 26
End: 2021-07-22

## 2021-07-25 ENCOUNTER — EMERGENCY (EMERGENCY)
Facility: HOSPITAL | Age: 26
LOS: 1 days | Discharge: HOME | End: 2021-07-25
Attending: EMERGENCY MEDICINE | Admitting: EMERGENCY MEDICINE
Payer: COMMERCIAL

## 2021-07-25 VITALS
SYSTOLIC BLOOD PRESSURE: 121 MMHG | HEART RATE: 81 BPM | OXYGEN SATURATION: 100 % | RESPIRATION RATE: 18 BRPM | DIASTOLIC BLOOD PRESSURE: 72 MMHG | TEMPERATURE: 99 F | HEIGHT: 71.5 IN

## 2021-07-25 VITALS
SYSTOLIC BLOOD PRESSURE: 123 MMHG | OXYGEN SATURATION: 100 % | RESPIRATION RATE: 16 BRPM | DIASTOLIC BLOOD PRESSURE: 62 MMHG | TEMPERATURE: 98 F | HEART RATE: 75 BPM

## 2021-07-25 DIAGNOSIS — Z90.5 ACQUIRED ABSENCE OF KIDNEY: Chronic | ICD-10-CM

## 2021-07-25 PROCEDURE — 72125 CT NECK SPINE W/O DYE: CPT | Mod: 26

## 2021-07-25 PROCEDURE — 70486 CT MAXILLOFACIAL W/O DYE: CPT | Mod: 26

## 2021-07-25 PROCEDURE — 12011 RPR F/E/E/N/L/M 2.5 CM/<: CPT

## 2021-07-25 PROCEDURE — 99285 EMERGENCY DEPT VISIT HI MDM: CPT | Mod: 25

## 2021-07-25 PROCEDURE — 70450 CT HEAD/BRAIN W/O DYE: CPT | Mod: 26

## 2021-07-25 RX ORDER — CEPHALEXIN 500 MG
1 CAPSULE ORAL
Qty: 20 | Refills: 0
Start: 2021-07-25 | End: 2021-08-03

## 2021-07-25 RX ORDER — TETANUS TOXOID, REDUCED DIPHTHERIA TOXOID AND ACELLULAR PERTUSSIS VACCINE, ADSORBED 5; 2.5; 8; 8; 2.5 [IU]/.5ML; [IU]/.5ML; UG/.5ML; UG/.5ML; UG/.5ML
0.5 SUSPENSION INTRAMUSCULAR ONCE
Refills: 0 | Status: COMPLETED | OUTPATIENT
Start: 2021-07-25 | End: 2021-07-25

## 2021-07-25 RX ORDER — LIDOCAINE HYDROCHLORIDE AND EPINEPHRINE 10; 10 MG/ML; UG/ML
20 INJECTION, SOLUTION INFILTRATION; PERINEURAL ONCE
Refills: 0 | Status: COMPLETED | OUTPATIENT
Start: 2021-07-25 | End: 2021-07-25

## 2021-07-25 RX ORDER — LIDOCAINE HCL 20 MG/ML
20 VIAL (ML) INJECTION ONCE
Refills: 0 | Status: COMPLETED | OUTPATIENT
Start: 2021-07-25 | End: 2021-07-25

## 2021-07-25 RX ADMIN — TETANUS TOXOID, REDUCED DIPHTHERIA TOXOID AND ACELLULAR PERTUSSIS VACCINE, ADSORBED 0.5 MILLILITER(S): 5; 2.5; 8; 8; 2.5 SUSPENSION INTRAMUSCULAR at 07:41

## 2021-07-25 RX ADMIN — LIDOCAINE HYDROCHLORIDE AND EPINEPHRINE 20 MILLILITER(S): 10; 10 INJECTION, SOLUTION INFILTRATION; PERINEURAL at 08:34

## 2021-07-25 RX ADMIN — Medication 20 MILLILITER(S): at 11:50
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
## 2021-07-25 NOTE — ED PROVIDER NOTE - PATIENT PORTAL LINK FT
You can access the FollowMyHealth Patient Portal offered by [facility] by registering at the following website: [email or web address]. By joining [employer]’s FollowMyHealth portal, you will also be able to view your health information using other applications (apps) compatible with our system.

## 2021-07-25 NOTE — ED ADULT NURSE NOTE - PATIENT IS UNABLE TO BE SCREENED DUE TO:
Acuity of illness Updated History: Per H&P dx: 1) Generalized weakness. Likely due to recent fall and respiratory infection-afebrile due to using Ibuprofen 800mg ATC for back and leg pain. 2) Frequent Falls: This time, fall was due to generalized weakness and slipped OOB, possibly due to recent respiratory illness. 3) Acute respiratory failure: unspecified whether with hypoxia or hypercapnia. Plan: HR >100, RR 26, but not hypoxic, clinically appears some distress with increase WOB; Suspect viral respiratory infection with significant brochospasm. Cardio following: Airways sound tight, but patient is not grossly volume overloaded. No evidence of recent ACS or decompensated heart failure. Per IM: pt w/ congestion/wheezing. +fevers. Per pulm (12/16), The patient describes chronic nasal congestion which is somewhat worse than usual associated with rhinorrhea and a post nasal drip. He has chest congestion and wheeze without cough or sputum production. He is not short of breath sitting in the chair on room air. He has low grade fevers without chills or sweats. No chest pain/pressure or palpitations. Pulm assessment: less rhinorrhea, post-nasal drip, nasal congestion ->URI/sinusitis although RVP is negative. Bronchospasm- improved, likely obstructive sleep apnea. Recurrent falls with mild elevation in CPK. No SOB, no cough, or sputum production. F/u with pulm: +confusion and leukocytosis likely related to systemic steroids. ID following for fevers: Possible URI given congestion with post-nasal drip, pt with wheezing.  Started on steroids and empiric abx.  No consolidations on CT imaging.   Chest x-ray (12/16): FINDINGS:  The lungs are clear. No pleural effusions or pneumothoraces. The cardiomediastinal silhouette cannot be accurately assessed on the current projection. Degenerative changes of the spine.  IMPRESSION: Clear lungs.  Now elevated WBC  Rapid Response 12/18:due to AF with RVR without hemodynamic instability, chest pain/pressure or palpitations -> sudafed and Afrin discontinued. audible exhalations during mastication of soft solids
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
## 2021-07-25 NOTE — ED PROVIDER NOTE - IV ALTEPLASE DOOR HIDDEN
Impression: Tear film insufficiency of bilateral lacrimal glands: H04.123. Plan: Recommend patient to use ATs QID or more OU. show

## 2021-07-25 NOTE — ED ADULT NURSE NOTE - OBJECTIVE STATEMENT
Patient presents to ED intoxicated and lethargic but arousable to painful stimulation.  He has swelling and bruising to left eye with laceration under left eye and he is unable to open left eye.  MD at bedside examining patient.  Respirations equal and unlabored.  Vitals taken and will continue to monitor patient.  Belongings in  in high acuity locker.
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
## 2021-07-25 NOTE — ED PROVIDER NOTE - DOMESTIC TRAVEL HIGH RISK QUESTION
[facility] Care Coordination Contact    Member became effective with  Jaime on 09/01/19 with Tyree MSC+.  Previous Health Plan: MA/Fee For Service  Previous Care System: N/A  Previous care coordinators name and number: N/A  Waiver Type: N/A  Last MMIS Entry: Date N/A and Type N/A  MMIS visit date if different from above: N/A  Services Listed in MMIS: N/A  No UTF to request.    Mariposa Bates  Care Management Specialist  [facility]  [phone]       No
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
## 2021-07-25 NOTE — ED PROVIDER NOTE - PHYSICAL EXAMINATION
Vital signs reviewed.  CONSTITUTIONAL: intoxicated, responds to sternal rub and intermittently to Thai interrogatives  HEAD: Normocephalic; left eye with hematoma swollen shut.  There is a small 1cm laceration under his left eye, oozing blood.  EYES: as above.  no conjunctival injection, no scleral icterus  MOUTH/THROAT:  MMM  NECK: Trachea midline  CV: Normal S1, S2; no audible murmurs; extremities WWP.  No chest wall bruising or tenderness, no clavicular deformity.  RESP: normal work of breathing; CTAB, no stridor  ABD: soft, non-distended; non-tender  : No blood at meatus  MSK/EXT: No obvious deformity.  no edema, no limited ROM  SKIN: No rashes on exposed skin surfaces.  No obvious trauma aside from face.  NEURO: Moves all extremities spontaneously with no focal deficits, not responding appropriately to commands

## 2021-07-25 NOTE — ED PROCEDURE NOTE - ATTENDING CONTRIBUTION TO CARE
I have participated in and supervised all key portions of the above procedures and agree with the above documentation. - Chung GANT
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
## 2021-07-25 NOTE — ED PROVIDER NOTE - CARE PROVIDER_API CALL
Lowell Dunn (DPM)  Podiatric Medicine and Surgery  [address] [place] [address]  Phone: [phone]  Fax: [phone]  Follow Up Time: Urgent
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
## 2021-07-29 ENCOUNTER — EMERGENCY (EMERGENCY)
Facility: HOSPITAL | Age: 26
LOS: 0 days | Discharge: HOME | End: 2021-07-29
Attending: EMERGENCY MEDICINE | Admitting: EMERGENCY MEDICINE
Payer: COMMERCIAL

## 2021-07-29 VITALS
DIASTOLIC BLOOD PRESSURE: 62 MMHG | HEART RATE: 85 BPM | OXYGEN SATURATION: 99 % | TEMPERATURE: 98 F | HEIGHT: 71.5 IN | RESPIRATION RATE: 18 BRPM | WEIGHT: 210.1 LBS | SYSTOLIC BLOOD PRESSURE: 130 MMHG

## 2021-07-29 DIAGNOSIS — F31.9 BIPOLAR DISORDER, UNSPECIFIED: ICD-10-CM

## 2021-07-29 DIAGNOSIS — M25.562 PAIN IN LEFT KNEE: ICD-10-CM

## 2021-07-29 DIAGNOSIS — S00.83XA CONTUSION OF OTHER PART OF HEAD, INITIAL ENCOUNTER: ICD-10-CM

## 2021-07-29 DIAGNOSIS — Z90.5 ACQUIRED ABSENCE OF KIDNEY: ICD-10-CM

## 2021-07-29 DIAGNOSIS — M25.472 EFFUSION, LEFT ANKLE: ICD-10-CM

## 2021-07-29 DIAGNOSIS — M25.471 EFFUSION, RIGHT ANKLE: ICD-10-CM

## 2021-07-29 DIAGNOSIS — Z90.5 ACQUIRED ABSENCE OF KIDNEY: Chronic | ICD-10-CM
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
## 2021-07-29 DIAGNOSIS — M25.572 PAIN IN LEFT ANKLE AND JOINTS OF LEFT FOOT: ICD-10-CM

## 2021-07-29 DIAGNOSIS — M25.571 PAIN IN RIGHT ANKLE AND JOINTS OF RIGHT FOOT: ICD-10-CM

## 2021-07-29 DIAGNOSIS — S80.12XA CONTUSION OF LEFT LOWER LEG, INITIAL ENCOUNTER: ICD-10-CM
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
## 2021-07-29 PROCEDURE — 99282 EMERGENCY DEPT VISIT SF MDM: CPT

## 2021-07-29 RX ORDER — ACETAMINOPHEN 500 MG
650 TABLET ORAL ONCE
Refills: 0 | Status: COMPLETED | OUTPATIENT
Start: 2021-07-29 | End: 2021-07-29

## 2021-07-29 RX ADMIN — Medication 650 MILLIGRAM(S): at 10:52

## 2021-07-29 NOTE — ED PROVIDER NOTE - PHYSICAL EXAMINATION
CONSTITUTIONAL: Well-developed; well-nourished; in no acute distress. Sitting comfortably in chair  SKIN: warm, dry  HEAD: Normocephalic; atraumatic.  EYES: PERRL,A EOMI, normal sclera and conjunctiva   ENT: No nasal discharge; airway clear.  NECK: Supple; non tender.  CARD:  Regular rate and rhythm.   RESP: NO inc WOB   ABD: soft ntnd  EXT: Normal ROM.    LYMPH: No acute cervical adenopathy.  NEURO: Alert, oriented, grossly unremarkable  MSK: PT able to ambulate normally without pain, no LT knee pain to palpation, negative valgus, varus, anterior drawer test. No pain with palpation of the naviculus, base of 5th metatarsal, medial or lateral maleolus b/l  PSYCH: Cooperative, appropriate.
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
## 2021-07-29 NOTE — ED PROVIDER NOTE - PATIENT PORTAL LINK FT
You can access the FollowMyHealth Patient Portal offered by [facility] by registering at the following website: [email or web address]. By joining [email or web address]’s FollowMyHealth portal, you will also be able to view your health information using other applications (apps) compatible with our system.

## 2021-07-29 NOTE — ED PROVIDER NOTE - NS ED ROS FT
CONSTITUTIONAL - No acute distress , No weight change  SKIN - No rash  HEMATOLOGIC - No abnormal bleeding or bruising  EYES - , No conjunctival injection, No drainage. + LT black eye, no vision change  ENT -, No sore throat, No neck pain, No rhinorrhea, No ear pain  RESPIRATORY - No shortness of breath, No cough  CARDIAC -No chest pain, No palpitations  GI - No abdominal pain, No nausea, No vomiting, No diarrhea, No constipation, No bright red blood per rectum or melena. No flank pain  - No dysuria, frequency, hematuria.   ENDO - No polydipsia, No polyuria, No heat/cold intolerance  MUSCULOSKELETAL - + joint paint, +b/l ankle swelling, +b/l ankle pain, +LT knee pain, No back pain  NEUROLOGIC - No numbness, No focal weakness, No headache, No dizziness  ALLERGIC- no pruritis

## 2021-07-29 NOTE — ED PROVIDER NOTE - NSFOLLOWUPCLINICS_GEN_ALL_ED_FT
[facility] ([facility])  Rehabilitation  [facility] [address] [place] [address]  Phone: [phone]  Fax:   Follow Up Time: 1-3 Days
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
## 2021-08-02 PROBLEM — Z00.00 ENCOUNTER FOR PREVENTIVE HEALTH EXAMINATION: Status: ACTIVE | Noted: 2021-08-02

## 2021-08-03 ENCOUNTER — OUTPATIENT (OUTPATIENT)
Dept: OUTPATIENT SERVICES | Facility: HOSPITAL | Age: 26
LOS: 1 days | Discharge: HOME | End: 2021-08-03

## 2021-08-03 ENCOUNTER — APPOINTMENT (OUTPATIENT)
Dept: BURN CARE | Facility: CLINIC | Age: 26
End: 2021-08-03
Payer: COMMERCIAL

## 2021-08-03 DIAGNOSIS — Z90.5 ACQUIRED ABSENCE OF KIDNEY: Chronic | ICD-10-CM

## 2021-08-03 PROCEDURE — 99202 OFFICE O/P NEW SF 15 MIN: CPT

## 2024-02-13 NOTE — ED BEHAVIORAL HEALTH ASSESSMENT NOTE - CURRENT PLAN:
Patient arrives via EMS from his sister's home with c/o AMS, generalized weakness, and slow movement. Per EMS, the patient was recently Dx with diabetes and has been staying with his sister to help him manage his new medications. Per patient, he has been feeling weak and fatigued x2-3 days. EMS reports family noticed a change in his mentation this morning at 0300. Upon arrival, pt is A&O to self and place. Pt appears jaundice.  
None known